# Patient Record
Sex: FEMALE | Race: WHITE | URBAN - NONMETROPOLITAN AREA
[De-identification: names, ages, dates, MRNs, and addresses within clinical notes are randomized per-mention and may not be internally consistent; named-entity substitution may affect disease eponyms.]

---

## 2018-02-14 ENCOUNTER — OFFICE VISIT (OUTPATIENT)
Dept: CARDIOLOGY | Age: 18
End: 2018-02-14
Payer: MEDICAID

## 2018-02-14 VITALS
SYSTOLIC BLOOD PRESSURE: 122 MMHG | HEIGHT: 66 IN | WEIGHT: 290 LBS | BODY MASS INDEX: 46.61 KG/M2 | DIASTOLIC BLOOD PRESSURE: 72 MMHG | HEART RATE: 86 BPM

## 2018-02-14 DIAGNOSIS — R00.0 TACHYCARDIA: ICD-10-CM

## 2018-02-14 DIAGNOSIS — R06.02 SOB (SHORTNESS OF BREATH): ICD-10-CM

## 2018-02-14 DIAGNOSIS — R00.2 PALPITATIONS: ICD-10-CM

## 2018-02-14 DIAGNOSIS — R53.83 OTHER FATIGUE: ICD-10-CM

## 2018-02-14 DIAGNOSIS — R00.0 TACHYARRHYTHMIA: Primary | ICD-10-CM

## 2018-02-14 PROCEDURE — 1036F TOBACCO NON-USER: CPT | Performed by: INTERNAL MEDICINE

## 2018-02-14 PROCEDURE — G8427 DOCREV CUR MEDS BY ELIG CLIN: HCPCS | Performed by: INTERNAL MEDICINE

## 2018-02-14 PROCEDURE — 99203 OFFICE O/P NEW LOW 30 MIN: CPT | Performed by: INTERNAL MEDICINE

## 2018-02-14 PROCEDURE — G8417 CALC BMI ABV UP PARAM F/U: HCPCS | Performed by: INTERNAL MEDICINE

## 2018-02-14 PROCEDURE — G8484 FLU IMMUNIZE NO ADMIN: HCPCS | Performed by: INTERNAL MEDICINE

## 2018-02-15 PROBLEM — R00.0 TACHYARRHYTHMIA: Status: ACTIVE | Noted: 2018-02-15

## 2018-03-13 ENCOUNTER — TELEPHONE (OUTPATIENT)
Dept: CARDIOLOGY | Age: 18
End: 2018-03-13

## 2020-05-29 ENCOUNTER — TELEPHONE (OUTPATIENT)
Dept: CARDIOLOGY | Age: 20
End: 2020-05-29

## 2020-05-29 ENCOUNTER — OFFICE VISIT (OUTPATIENT)
Dept: CARDIOLOGY | Age: 20
End: 2020-05-29
Payer: MEDICAID

## 2020-05-29 VITALS
SYSTOLIC BLOOD PRESSURE: 124 MMHG | DIASTOLIC BLOOD PRESSURE: 86 MMHG | HEART RATE: 84 BPM | WEIGHT: 283 LBS | HEIGHT: 66 IN | BODY MASS INDEX: 45.48 KG/M2

## 2020-05-29 PROCEDURE — 1036F TOBACCO NON-USER: CPT | Performed by: INTERNAL MEDICINE

## 2020-05-29 PROCEDURE — G8427 DOCREV CUR MEDS BY ELIG CLIN: HCPCS | Performed by: INTERNAL MEDICINE

## 2020-05-29 PROCEDURE — 99213 OFFICE O/P EST LOW 20 MIN: CPT | Performed by: INTERNAL MEDICINE

## 2020-05-29 PROCEDURE — G8417 CALC BMI ABV UP PARAM F/U: HCPCS | Performed by: INTERNAL MEDICINE

## 2020-05-29 ASSESSMENT — ENCOUNTER SYMPTOMS
CONSTIPATION: 0
ABDOMINAL DISTENTION: 0
BLOOD IN STOOL: 0
BACK PAIN: 0
DIARRHEA: 0
VOMITING: 0
WHEEZING: 0
SHORTNESS OF BREATH: 0
COUGH: 0
EYE DISCHARGE: 0
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
